# Patient Record
Sex: FEMALE | Race: WHITE | NOT HISPANIC OR LATINO | Employment: FULL TIME | URBAN - METROPOLITAN AREA
[De-identification: names, ages, dates, MRNs, and addresses within clinical notes are randomized per-mention and may not be internally consistent; named-entity substitution may affect disease eponyms.]

---

## 2023-02-16 PROBLEM — R74.8 ELEVATED LIVER ENZYMES: Status: ACTIVE | Noted: 2023-02-16

## 2023-02-16 PROBLEM — M54.50 LUMBAGO: Status: ACTIVE | Noted: 2023-02-16

## 2023-02-16 PROBLEM — M25.50 ARTHRALGIA: Status: ACTIVE | Noted: 2023-02-16

## 2023-02-16 PROBLEM — G47.33 OBSTRUCTIVE SLEEP APNEA: Status: ACTIVE | Noted: 2023-02-16

## 2023-02-16 PROBLEM — E66.01 MORBID OBESITY (MULTI): Status: ACTIVE | Noted: 2023-02-16

## 2023-02-16 PROBLEM — Z98.84 BARIATRIC SURGERY STATUS: Status: ACTIVE | Noted: 2023-02-16

## 2023-02-16 PROBLEM — J02.0 STREPTOCOCCAL PHARYNGITIS: Status: ACTIVE | Noted: 2023-02-16

## 2023-02-16 PROBLEM — E55.9 VITAMIN D DEFICIENCY: Status: ACTIVE | Noted: 2023-02-16

## 2023-02-16 PROBLEM — K75.81 NASH (NONALCOHOLIC STEATOHEPATITIS): Status: ACTIVE | Noted: 2023-02-16

## 2023-02-16 PROBLEM — J20.9 ACUTE BRONCHITIS, UNSPECIFIED: Status: ACTIVE | Noted: 2023-02-16

## 2023-02-16 PROBLEM — M25.569 PAIN IN JOINT, LOWER LEG: Status: RESOLVED | Noted: 2023-02-16 | Resolved: 2023-02-16

## 2023-02-16 PROBLEM — M54.6 DORSALGIA OF THORACIC REGION: Status: ACTIVE | Noted: 2023-02-16

## 2023-02-16 PROBLEM — F41.0 SEVERE ANXIETY WITH PANIC: Status: ACTIVE | Noted: 2023-02-16

## 2023-02-16 PROBLEM — N39.0 ACUTE UTI: Status: ACTIVE | Noted: 2023-02-16

## 2023-02-16 PROBLEM — E03.9 HYPOTHYROIDISM: Status: ACTIVE | Noted: 2023-02-16

## 2023-02-16 PROBLEM — R79.82 ELEVATED C-REACTIVE PROTEIN: Status: ACTIVE | Noted: 2023-02-16

## 2023-02-16 PROBLEM — M79.7 FIBROMYALGIA: Status: ACTIVE | Noted: 2023-02-16

## 2023-02-16 PROBLEM — J06.9 URI (UPPER RESPIRATORY INFECTION): Status: ACTIVE | Noted: 2023-02-16

## 2023-02-16 PROBLEM — J30.9 ALLERGIC RHINITIS: Status: ACTIVE | Noted: 2023-02-16

## 2023-02-16 PROBLEM — I10 HYPERTENSION: Status: ACTIVE | Noted: 2023-02-16

## 2023-02-16 PROBLEM — D72.825 BANDEMIA: Status: ACTIVE | Noted: 2023-02-16

## 2023-02-16 PROBLEM — M70.42 PREPATELLAR BURSITIS OF LEFT KNEE: Status: ACTIVE | Noted: 2023-02-16

## 2023-02-16 PROBLEM — M76.899 QUADRICEPS TENDINITIS: Status: ACTIVE | Noted: 2023-02-16

## 2023-02-16 PROBLEM — Z20.822 SUSPECTED COVID-19 VIRUS INFECTION: Status: ACTIVE | Noted: 2023-02-16

## 2023-02-16 PROBLEM — M99.02 SEGMENTAL AND SOMATIC DYSFUNCTION OF THORACIC REGION: Status: ACTIVE | Noted: 2023-02-16

## 2023-02-16 PROBLEM — R53.83 FATIGUE: Status: ACTIVE | Noted: 2023-02-16

## 2023-02-16 PROBLEM — R73.03 PREDIABETES: Status: ACTIVE | Noted: 2023-02-16

## 2023-02-16 PROBLEM — J02.9 SORE THROAT: Status: ACTIVE | Noted: 2023-02-16

## 2023-02-16 RX ORDER — LISINOPRIL AND HYDROCHLOROTHIAZIDE 20; 25 MG/1; MG/1
1 TABLET ORAL DAILY
COMMUNITY
Start: 2016-03-23 | End: 2023-07-17

## 2023-02-16 RX ORDER — LACTOBACILLUS ACIDOPHILUS/FOS 500MM-50MG
1 TABLET ORAL DAILY
COMMUNITY

## 2023-02-16 RX ORDER — LUTEIN 6 MG
1 TABLET ORAL 2 TIMES DAILY
COMMUNITY

## 2023-02-16 RX ORDER — ASPIRIN 325 MG
1 TABLET, DELAYED RELEASE (ENTERIC COATED) ORAL
COMMUNITY
Start: 2019-10-02

## 2023-02-16 RX ORDER — CYCLOBENZAPRINE HCL 10 MG
1 TABLET ORAL 3 TIMES DAILY PRN
COMMUNITY
Start: 2022-02-21 | End: 2023-04-06 | Stop reason: SDUPTHER

## 2023-02-16 RX ORDER — ALBUTEROL SULFATE 90 UG/1
2 AEROSOL, METERED RESPIRATORY (INHALATION)
COMMUNITY
Start: 2021-04-06 | End: 2023-04-06 | Stop reason: SDUPTHER

## 2023-02-16 RX ORDER — ALPRAZOLAM 0.5 MG/1
0.5 TABLET ORAL NIGHTLY PRN
COMMUNITY
Start: 2021-10-13 | End: 2023-04-06 | Stop reason: SDUPTHER

## 2023-02-16 RX ORDER — LEVOTHYROXINE, LIOTHYRONINE 9.5; 2.25 UG/1; UG/1
1 TABLET ORAL DAILY
COMMUNITY
Start: 2019-11-02 | End: 2023-05-17

## 2023-02-16 RX ORDER — HYDROXYZINE HYDROCHLORIDE 25 MG/1
25 TABLET, FILM COATED ORAL ONCE
COMMUNITY
Start: 2022-01-19 | End: 2023-10-05 | Stop reason: SDUPTHER

## 2023-02-16 RX ORDER — SERTRALINE HYDROCHLORIDE 25 MG/1
1 TABLET, FILM COATED ORAL DAILY
COMMUNITY
Start: 2021-10-13 | End: 2023-10-05 | Stop reason: ALTCHOICE

## 2023-04-06 ENCOUNTER — TELEPHONE (OUTPATIENT)
Dept: PRIMARY CARE | Facility: CLINIC | Age: 38
End: 2023-04-06

## 2023-04-06 ENCOUNTER — OFFICE VISIT (OUTPATIENT)
Dept: PRIMARY CARE | Facility: CLINIC | Age: 38
End: 2023-04-06
Payer: COMMERCIAL

## 2023-04-06 VITALS
TEMPERATURE: 97.5 F | OXYGEN SATURATION: 98 % | HEIGHT: 67 IN | HEART RATE: 74 BPM | SYSTOLIC BLOOD PRESSURE: 136 MMHG | BODY MASS INDEX: 45.99 KG/M2 | DIASTOLIC BLOOD PRESSURE: 64 MMHG | WEIGHT: 293 LBS

## 2023-04-06 DIAGNOSIS — K52.9 ENTEROCOLITIS: ICD-10-CM

## 2023-04-06 DIAGNOSIS — M79.7 FIBROMYALGIA: ICD-10-CM

## 2023-04-06 DIAGNOSIS — K52.9 ENTEROCOLITIS: Primary | ICD-10-CM

## 2023-04-06 DIAGNOSIS — E66.01 CLASS 3 SEVERE OBESITY DUE TO EXCESS CALORIES WITH SERIOUS COMORBIDITY AND BODY MASS INDEX (BMI) OF 60.0 TO 69.9 IN ADULT (MULTI): ICD-10-CM

## 2023-04-06 DIAGNOSIS — J20.9 ACUTE BRONCHITIS, UNSPECIFIED ORGANISM: ICD-10-CM

## 2023-04-06 DIAGNOSIS — F43.10 PTSD (POST-TRAUMATIC STRESS DISORDER): ICD-10-CM

## 2023-04-06 PROCEDURE — 3008F BODY MASS INDEX DOCD: CPT | Performed by: FAMILY MEDICINE

## 2023-04-06 PROCEDURE — 3078F DIAST BP <80 MM HG: CPT | Performed by: FAMILY MEDICINE

## 2023-04-06 PROCEDURE — 3075F SYST BP GE 130 - 139MM HG: CPT | Performed by: FAMILY MEDICINE

## 2023-04-06 PROCEDURE — 99214 OFFICE O/P EST MOD 30 MIN: CPT | Performed by: FAMILY MEDICINE

## 2023-04-06 RX ORDER — PREDNISONE 10 MG/1
TABLET ORAL
Qty: 21 TABLET | Refills: 0 | Status: SHIPPED | OUTPATIENT
Start: 2023-04-06 | End: 2023-04-12

## 2023-04-06 RX ORDER — CIPROFLOXACIN 500 MG/1
500 TABLET ORAL 2 TIMES DAILY
Qty: 7 TABLET | Refills: 0 | Status: SHIPPED | OUTPATIENT
Start: 2023-04-06 | End: 2023-04-11

## 2023-04-06 RX ORDER — PREDNISONE 10 MG/1
TABLET ORAL
Qty: 21 TABLET | Refills: 0 | Status: SHIPPED | OUTPATIENT
Start: 2023-04-06 | End: 2023-04-06 | Stop reason: SDUPTHER

## 2023-04-06 RX ORDER — ALBUTEROL SULFATE 90 UG/1
2 AEROSOL, METERED RESPIRATORY (INHALATION)
Qty: 18 G | Refills: 2 | Status: SHIPPED | OUTPATIENT
Start: 2023-04-06

## 2023-04-06 RX ORDER — CYCLOBENZAPRINE HCL 10 MG
10 TABLET ORAL 3 TIMES DAILY PRN
Qty: 30 TABLET | Refills: 1 | Status: SHIPPED | OUTPATIENT
Start: 2023-04-06 | End: 2023-10-25 | Stop reason: SDUPTHER

## 2023-04-06 RX ORDER — ALPRAZOLAM 0.5 MG/1
0.5 TABLET ORAL NIGHTLY PRN
Qty: 7 TABLET | Refills: 0 | Status: SHIPPED | OUTPATIENT
Start: 2023-04-06

## 2023-04-06 RX ORDER — METRONIDAZOLE 250 MG/1
250 TABLET ORAL 3 TIMES DAILY
Qty: 21 TABLET | Refills: 0 | Status: SHIPPED | OUTPATIENT
Start: 2023-04-06 | End: 2023-04-13

## 2023-04-06 ASSESSMENT — PATIENT HEALTH QUESTIONNAIRE - PHQ9
1. LITTLE INTEREST OR PLEASURE IN DOING THINGS: NOT AT ALL
2. FEELING DOWN, DEPRESSED OR HOPELESS: NOT AT ALL
SUM OF ALL RESPONSES TO PHQ9 QUESTIONS 1 AND 2: 0

## 2023-04-06 NOTE — TELEPHONE ENCOUNTER
Pharmacy called  stating the prednisone was sent over asking for you to clarify directions  it was a taper but at day 3 it says to stay on for 2 days. But quanity was not suffient. Please resend with correct taper in notes or quanity to match

## 2023-04-06 NOTE — PROGRESS NOTES
Subjective   Patient ID: Dinora Christensen is a 37 y.o. female who presents for Follow-up (Having stomach problems).  HPI  Saw ED 3/30  Some relief with Bentyl  Taking Tumeric powder again  Has Appt GI on 4/21/23  Still having abd pain  Is taking mag malate 2 a night.  Also taking fibro response which has mag malate in it.  Had first formed bowel movement after starting the Bentyl.    Has seen Dr Chávez for HENRY  Abd pain is still in the RLQ (worst)but is throughout the whole abd.  Pain is worsening.  Avoiding gluten and dairy.  Having nausea and Vomiting occurring every couple days.  6-20 BM daily.      Review of Systems    Objective   Physical Exam  General: Patient is alert and oriented ×3 and appears in no acute distress. No respiratory distress.  Morbid obesity.  Gait cannot    Head: Atraumatic normocephalic.    Eyes: EOMI, PERRLA      Ears: Canals patent without any irritation, tympanic membranes without inflammation, no swelling, normal light reflex.    Nose: Nares patent. Turbinates are not swollen. No discharge.    Mouth: Normal mucosa. Moist. No erythema, exudates, tonsillar enlargement.    Neck: Normal range of motion, no masses.  Thyroid is palpable and normal in size without any nodules. No anterior cervical or posterior cervical adenopathy.    Heart: Regular rate and rhythm, no murmurs clicks or gallops    Lungs: Clear to auscultation bilaterally without any rhonchi rales or wheezing, lung sounds heard throughout all lung fields    Abdomen: Soft, there is tenderness, no rigidity, rebound, guarding or organomegaly. Bowel sounds ×4 quadrants.    Musculoskeletal: Normal range of motion, strength is grossly intact in the proximal distal muscles of the upper and lower extremities bilaterally, deep tendon reflexes +2 out of 4 and symmetric bilaterally at the patella, Achilles, biceps, triceps, sensation intact.    Nerves: Cranial nerves II through XII appear grossly intact and without deficit    Skin: Intact,  dry, no rashes or erythema    Psych: Normal affect. ]  Assessment/Plan   Problem List Items Addressed This Visit       Acute bronchitis, unspecified    Relevant Medications    albuterol 90 mcg/actuation inhaler    Fibromyalgia    Relevant Medications    cyclobenzaprine (Flexeril) 10 mg tablet    PTSD (post-traumatic stress disorder)    Relevant Medications    ALPRAZolam (Xanax) 0.5 mg tablet    Enterocolitis - Primary    Relevant Medications    metroNIDAZOLE (Flagyl) 250 mg tablet    ciprofloxacin (Cipro) 500 mg tablet    Class 3 severe obesity due to excess calories with serious comorbidity and body mass index (BMI) of 60.0 to 69.9 in adult (CMS/[facility])

## 2023-04-08 PROBLEM — E66.813 CLASS 3 SEVERE OBESITY DUE TO EXCESS CALORIES WITH SERIOUS COMORBIDITY AND BODY MASS INDEX (BMI) OF 60.0 TO 69.9 IN ADULT: Status: ACTIVE | Noted: 2023-04-08

## 2023-04-08 PROBLEM — F43.10 PTSD (POST-TRAUMATIC STRESS DISORDER): Status: ACTIVE | Noted: 2023-04-08

## 2023-04-08 PROBLEM — K52.9 ENTEROCOLITIS: Status: ACTIVE | Noted: 2023-04-08

## 2023-04-08 PROBLEM — E66.01 CLASS 3 SEVERE OBESITY DUE TO EXCESS CALORIES WITH SERIOUS COMORBIDITY AND BODY MASS INDEX (BMI) OF 60.0 TO 69.9 IN ADULT (MULTI): Status: ACTIVE | Noted: 2023-04-08

## 2023-05-17 DIAGNOSIS — E03.9 HYPOTHYROIDISM, UNSPECIFIED TYPE: Primary | ICD-10-CM

## 2023-05-17 RX ORDER — LEVOTHYROXINE, LIOTHYRONINE 9.5; 2.25 UG/1; UG/1
TABLET ORAL
Qty: 30 TABLET | Refills: 4 | Status: SHIPPED | OUTPATIENT
Start: 2023-05-17 | End: 2023-10-16

## 2023-05-25 ENCOUNTER — CLINICAL SUPPORT (OUTPATIENT)
Dept: PRIMARY CARE | Facility: CLINIC | Age: 38
End: 2023-05-25
Payer: COMMERCIAL

## 2023-05-25 ENCOUNTER — TELEPHONE (OUTPATIENT)
Dept: PRIMARY CARE | Facility: CLINIC | Age: 38
End: 2023-05-25

## 2023-05-25 ENCOUNTER — APPOINTMENT (OUTPATIENT)
Dept: PRIMARY CARE | Facility: CLINIC | Age: 38
End: 2023-05-25
Payer: COMMERCIAL

## 2023-05-25 DIAGNOSIS — J02.9 SORE THROAT: ICD-10-CM

## 2023-05-25 LAB — POC RAPID STREP: NEGATIVE

## 2023-05-25 PROCEDURE — 87651 STREP A DNA AMP PROBE: CPT

## 2023-05-25 PROCEDURE — 87880 STREP A ASSAY W/OPTIC: CPT | Performed by: FAMILY MEDICINE

## 2023-05-25 NOTE — PROGRESS NOTES
Dinora came in to have a strep test done. It came back negative. We are sending it out for culture.

## 2023-05-25 NOTE — TELEPHONE ENCOUNTER
I put patient on for a virtual due to possible strep (very sore throat, swllen and blisters in back of throat). She said she was around her nephews who had it and are currently being treated. Do you want her to come in today either to do a visit in person or just for a strep test?

## 2023-05-26 ENCOUNTER — TELEPHONE (OUTPATIENT)
Dept: PRIMARY CARE | Facility: CLINIC | Age: 38
End: 2023-05-26
Payer: COMMERCIAL

## 2023-05-26 DIAGNOSIS — J02.9 SORE THROAT: Primary | ICD-10-CM

## 2023-05-26 LAB — GROUP A STREP, PCR: NOT DETECTED

## 2023-05-26 RX ORDER — AMOXICILLIN AND CLAVULANATE POTASSIUM 875; 125 MG/1; MG/1
875 TABLET, FILM COATED ORAL 2 TIMES DAILY
Qty: 20 TABLET | Refills: 0 | Status: SHIPPED | OUTPATIENT
Start: 2023-05-26 | End: 2023-05-26 | Stop reason: SDUPTHER

## 2023-05-26 RX ORDER — AMOXICILLIN AND CLAVULANATE POTASSIUM 875; 125 MG/1; MG/1
875 TABLET, FILM COATED ORAL 2 TIMES DAILY
Qty: 20 TABLET | Refills: 0 | Status: SHIPPED | OUTPATIENT
Start: 2023-05-26 | End: 2023-06-05

## 2023-06-06 NOTE — TELEPHONE ENCOUNTER
----- Message from Glenn Gonzales DO sent at 6/6/2023  9:19 AM EDT -----  Negative for strep  ----- Message -----  From: Mitra Guthrie MA  Sent: 5/25/2023   1:41 PM EDT  To: Glenn Gonzales DO

## 2023-07-17 DIAGNOSIS — I10 PRIMARY HYPERTENSION: Primary | ICD-10-CM

## 2023-07-17 RX ORDER — LISINOPRIL AND HYDROCHLOROTHIAZIDE 20; 25 MG/1; MG/1
TABLET ORAL
Qty: 30 TABLET | Refills: 11 | Status: SHIPPED | OUTPATIENT
Start: 2023-07-17

## 2023-10-05 ENCOUNTER — OFFICE VISIT (OUTPATIENT)
Dept: PRIMARY CARE | Facility: CLINIC | Age: 38
End: 2023-10-05
Payer: COMMERCIAL

## 2023-10-05 VITALS
SYSTOLIC BLOOD PRESSURE: 128 MMHG | HEART RATE: 87 BPM | WEIGHT: 293 LBS | BODY MASS INDEX: 45.99 KG/M2 | HEIGHT: 67 IN | TEMPERATURE: 97.2 F | OXYGEN SATURATION: 98 % | DIASTOLIC BLOOD PRESSURE: 82 MMHG

## 2023-10-05 DIAGNOSIS — K75.81 NASH (NONALCOHOLIC STEATOHEPATITIS): ICD-10-CM

## 2023-10-05 DIAGNOSIS — F43.10 PTSD (POST-TRAUMATIC STRESS DISORDER): ICD-10-CM

## 2023-10-05 DIAGNOSIS — E66.01 CLASS 3 SEVERE OBESITY DUE TO EXCESS CALORIES WITH SERIOUS COMORBIDITY AND BODY MASS INDEX (BMI) OF 60.0 TO 69.9 IN ADULT (MULTI): ICD-10-CM

## 2023-10-05 DIAGNOSIS — E03.8 HYPOTHYROIDISM DUE TO HASHIMOTO'S THYROIDITIS: ICD-10-CM

## 2023-10-05 DIAGNOSIS — M79.7 FIBROMYALGIA: ICD-10-CM

## 2023-10-05 DIAGNOSIS — I10 PRIMARY HYPERTENSION: ICD-10-CM

## 2023-10-05 DIAGNOSIS — L21.0 SEBORRHEA CAPITIS: ICD-10-CM

## 2023-10-05 DIAGNOSIS — E11.9 TYPE 2 DIABETES MELLITUS WITHOUT COMPLICATION, WITHOUT LONG-TERM CURRENT USE OF INSULIN (MULTI): ICD-10-CM

## 2023-10-05 DIAGNOSIS — G47.33 OBSTRUCTIVE SLEEP APNEA: ICD-10-CM

## 2023-10-05 DIAGNOSIS — E06.3 HYPOTHYROIDISM DUE TO HASHIMOTO'S THYROIDITIS: ICD-10-CM

## 2023-10-05 DIAGNOSIS — Z00.00 WELL ADULT EXAM: Primary | ICD-10-CM

## 2023-10-05 PROBLEM — R10.13 EPIGASTRIC PAIN: Status: ACTIVE | Noted: 2023-10-05

## 2023-10-05 PROBLEM — R19.7 DIARRHEA: Status: ACTIVE | Noted: 2023-10-05

## 2023-10-05 PROCEDURE — 99395 PREV VISIT EST AGE 18-39: CPT | Performed by: FAMILY MEDICINE

## 2023-10-05 PROCEDURE — 3008F BODY MASS INDEX DOCD: CPT | Performed by: FAMILY MEDICINE

## 2023-10-05 PROCEDURE — 1036F TOBACCO NON-USER: CPT | Performed by: FAMILY MEDICINE

## 2023-10-05 PROCEDURE — 3074F SYST BP LT 130 MM HG: CPT | Performed by: FAMILY MEDICINE

## 2023-10-05 PROCEDURE — 3079F DIAST BP 80-89 MM HG: CPT | Performed by: FAMILY MEDICINE

## 2023-10-05 PROCEDURE — 99214 OFFICE O/P EST MOD 30 MIN: CPT | Performed by: FAMILY MEDICINE

## 2023-10-05 RX ORDER — HYDROCORTISONE 25 MG/G
CREAM TOPICAL 2 TIMES DAILY PRN
Qty: 30 G | Refills: 2 | Status: SHIPPED | OUTPATIENT
Start: 2023-10-05 | End: 2024-10-04

## 2023-10-05 RX ORDER — HYDROXYZINE HYDROCHLORIDE 25 MG/1
25 TABLET, FILM COATED ORAL NIGHTLY
Qty: 30 TABLET | Refills: 3 | Status: SHIPPED | OUTPATIENT
Start: 2023-10-05 | End: 2024-02-02

## 2023-10-05 RX ORDER — KETOCONAZOLE 20 MG/ML
SHAMPOO, SUSPENSION TOPICAL 2 TIMES WEEKLY
Qty: 120 ML | Refills: 0 | Status: SHIPPED | OUTPATIENT
Start: 2023-10-05

## 2023-10-05 ASSESSMENT — PATIENT HEALTH QUESTIONNAIRE - PHQ9
2. FEELING DOWN, DEPRESSED OR HOPELESS: NOT AT ALL
1. LITTLE INTEREST OR PLEASURE IN DOING THINGS: NOT AT ALL
SUM OF ALL RESPONSES TO PHQ9 QUESTIONS 1 AND 2: 0

## 2023-10-05 NOTE — PROGRESS NOTES
Subjective   Patient ID: Dinora Christensen is a 38 y.o. female who presents for Follow-up (Talk about her hip /).  HPI  Follow up on chronic medical issues  The patient continues to deal with her posttraumatic stress disorder. We do have her taking hydroxyzine as needed at nighttime     essential hypertension  Controlled on lisinopril hydrochlorothiazide. Denies any problems with headaches, change in vision, chest pain, shortness breath, swelling in the extremities.     Nonalcoholic fatty liver disease   losing weight Patient is following with hepatologist       Severe morbid obesity  Patient's BMI is 61 which is down from 64. She has been losing weight and trying to follow diet and exercise. Continue current path     diabetes  HBA1C has been in the diabetic range  The patient's health since the last visit is described as good. There are no interval changes in the patient's PMH, PSH, and current medications. There are no interval changes in the patient's social and family history. She has regular dental visits. She denies vision problems.   Lifestyle: She consumes a diverse and healthy diet. She has weight concerns. She exercises regularly. She does not use tobacco. She consumes alcohol very rarely  Reproductive health: the patient is premenopausal. she reports abnormal menses. No relationship. Painful and heavy. Regular interval.  GYN needs to see.    Pregnancy History:  1 and 1 full term.   Cervical cancer screening: cancer screening reviewed and current . patient has no history of an abnormal pap smear.    Screening interval recommendation: No abnormal PAP.   Metabolic screening: lipid profile performed within the past five years.   Review of Systems    Objective   Physical Exam  General: Patient is alert and oriented Ã--3 and appears in no acute distress. No respiratory distress. Morbid obesity     Head: Atraumatic normocephalic.     Eyes: EOMI, PERRLA      Ears: Canals patent without any irritation,  tympanic membranes without inflammation, no swelling, normal light reflex.     Mouth: Normal mucosa. Moist. No erythema, exudates, tonsillar enlargement.     Neck: Normal range of motion, no masses. Thyroid is palpable and normal in size without any nodules. No anterior cervical or posterior cervical adenopathy.     Heart: Regular rate and rhythm, no murmurs clicks or gallops     Lungs: Clear to auscultation bilaterally without any rhonchi rales or wheezing, lung sounds heard throughout all lung fields     Abdomen: Soft, nontender, no rigidity, rebound, guarding or organomegaly. Bowel sounds Ã--4 quadrants.     Musculoskeletal: Normal range of motion, strength is grossly intact in the proximal distal muscles of the upper and lower extremities bilaterally, deep tendon reflexes +2 out of 4 and symmetric bilaterally at the patella, Achilles, biceps, triceps, sensation intact.     Nerves: Cranial nerves II through XII appear grossly intact and without deficit     Skin: Intact, dry, no rashes or erythema     Psych: Normal affect. Denies suicidal or homicidal ideations.   Assessment/Plan   Problem List Items Addressed This Visit       Fibromyalgia    Hypertension    Relevant Orders    CBC and Auto Differential    Comprehensive Metabolic Panel    Lipid Panel    TSH with reflex to Free T4 if abnormal    Vitamin B12    Hypothyroidism    Relevant Orders    TSH with reflex to Free T4 if abnormal    HENRY (nonalcoholic steatohepatitis)    Relevant Medications    semaglutide 0.25 mg or 0.5 mg (2 mg/3 mL) pen injector    Obstructive sleep apnea    PTSD (post-traumatic stress disorder)    Relevant Medications    hydrOXYzine HCL (Atarax) 25 mg tablet    Class 3 severe obesity due to excess calories with serious comorbidity and body mass index (BMI) of 60.0 to 69.9 in adult (CMS/[facility])     Other Visit Diagnoses       Well adult exam    -  Primary    Type 2 diabetes mellitus without complication, without long-term current use of insulin  (CMS/HCC)        Relevant Medications    semaglutide 0.25 mg or 0.5 mg (2 mg/3 mL) pen injector    Other Relevant Orders    CBC and Auto Differential    Comprehensive Metabolic Panel    Lipid Panel    TSH with reflex to Free T4 if abnormal    Vitamin B12    Seborrhea capitis        Relevant Medications    ketoconazole (NIZOral) 2 % shampoo    hydrocortisone 2.5 % cream           The patient is a 38-year-old female with a past medical history of obstructive sleep apnea, nonalcoholic fatty liver disease, hypertension, hypothyroidism, prediabetes  Depression and anxiety and following with counselor. Controlled with sertraline 25 mg daily. Patient denies any suicidal or homicidal ideations.  Labs ordered to follow-up on chronic conditions  Instructed her to follow-up with  [facility] or gynecology  Patient has seen Dr. Chávez for the nonalcoholic fatty liver. At this point time was instructed to have weight loss.   Patient has followed up with Dr. Rober Navarrete for gastroenterology and they were going to schedule a scope but she had resolution of her abdominal pain.  Most likely viral  Diabetes mellitus type 2-started Ozempic 0.5 mg which she will do weekly.  Discussed the risk and benefit of the medication.  We will follow-up on this in 6 months      Essential hypertension is controlled on current medications  Patient does have seborrhea over the left ear.  Started ketoconazole cream and also gave hydrocortisone cream to put on 2 times a day for 14 days    Follow-up as needed or in 6 months

## 2023-10-16 DIAGNOSIS — E03.9 HYPOTHYROIDISM, UNSPECIFIED TYPE: ICD-10-CM

## 2023-10-16 RX ORDER — LEVOTHYROXINE, LIOTHYRONINE 9.5; 2.25 UG/1; UG/1
TABLET ORAL
Qty: 30 TABLET | Refills: 4 | Status: SHIPPED | OUTPATIENT
Start: 2023-10-16 | End: 2023-10-25

## 2023-10-24 ENCOUNTER — LAB (OUTPATIENT)
Dept: LAB | Facility: LAB | Age: 38
End: 2023-10-24
Payer: COMMERCIAL

## 2023-10-24 DIAGNOSIS — I10 PRIMARY HYPERTENSION: ICD-10-CM

## 2023-10-24 DIAGNOSIS — E03.9 HYPOTHYROIDISM, UNSPECIFIED TYPE: ICD-10-CM

## 2023-10-24 DIAGNOSIS — E11.9 TYPE 2 DIABETES MELLITUS WITHOUT COMPLICATION, WITHOUT LONG-TERM CURRENT USE OF INSULIN (MULTI): ICD-10-CM

## 2023-10-24 DIAGNOSIS — E03.8 HYPOTHYROIDISM DUE TO HASHIMOTO'S THYROIDITIS: ICD-10-CM

## 2023-10-24 DIAGNOSIS — E06.3 HYPOTHYROIDISM DUE TO HASHIMOTO'S THYROIDITIS: ICD-10-CM

## 2023-10-24 LAB
ALBUMIN SERPL BCP-MCNC: 4 G/DL (ref 3.4–5)
ALP SERPL-CCNC: 49 U/L (ref 33–110)
ALT SERPL W P-5'-P-CCNC: 23 U/L (ref 7–45)
ANION GAP SERPL CALC-SCNC: 13 MMOL/L (ref 10–20)
AST SERPL W P-5'-P-CCNC: 24 U/L (ref 9–39)
BASOPHILS # BLD AUTO: 0.06 X10*3/UL (ref 0–0.1)
BASOPHILS NFR BLD AUTO: 0.6 %
BILIRUB SERPL-MCNC: 0.6 MG/DL (ref 0–1.2)
BUN SERPL-MCNC: 14 MG/DL (ref 6–23)
CALCIUM SERPL-MCNC: 9.1 MG/DL (ref 8.6–10.3)
CHLORIDE SERPL-SCNC: 101 MMOL/L (ref 98–107)
CHOLEST SERPL-MCNC: 153 MG/DL (ref 0–199)
CHOLESTEROL/HDL RATIO: 3.4
CO2 SERPL-SCNC: 25 MMOL/L (ref 21–32)
CREAT SERPL-MCNC: 0.69 MG/DL (ref 0.5–1.05)
EOSINOPHIL # BLD AUTO: 0.42 X10*3/UL (ref 0–0.7)
EOSINOPHIL NFR BLD AUTO: 4.4 %
ERYTHROCYTE [DISTWIDTH] IN BLOOD BY AUTOMATED COUNT: 14.2 % (ref 11.5–14.5)
GFR SERPL CREATININE-BSD FRML MDRD: >90 ML/MIN/1.73M*2
GLUCOSE SERPL-MCNC: 97 MG/DL (ref 74–99)
HCT VFR BLD AUTO: 40.4 % (ref 36–46)
HDLC SERPL-MCNC: 45.5 MG/DL
HGB BLD-MCNC: 12.7 G/DL (ref 12–16)
IMM GRANULOCYTES # BLD AUTO: 0.03 X10*3/UL (ref 0–0.7)
IMM GRANULOCYTES NFR BLD AUTO: 0.3 % (ref 0–0.9)
LDLC SERPL CALC-MCNC: 91 MG/DL
LYMPHOCYTES # BLD AUTO: 2.06 X10*3/UL (ref 1.2–4.8)
LYMPHOCYTES NFR BLD AUTO: 21.5 %
MCH RBC QN AUTO: 26.5 PG (ref 26–34)
MCHC RBC AUTO-ENTMCNC: 31.4 G/DL (ref 32–36)
MCV RBC AUTO: 84 FL (ref 80–100)
MONOCYTES # BLD AUTO: 0.79 X10*3/UL (ref 0.1–1)
MONOCYTES NFR BLD AUTO: 8.2 %
NEUTROPHILS # BLD AUTO: 6.23 X10*3/UL (ref 1.2–7.7)
NEUTROPHILS NFR BLD AUTO: 65 %
NON HDL CHOLESTEROL: 108 MG/DL (ref 0–149)
NRBC BLD-RTO: 0 /100 WBCS (ref 0–0)
PLATELET # BLD AUTO: 446 X10*3/UL (ref 150–450)
PMV BLD AUTO: 9 FL (ref 7.5–11.5)
POTASSIUM SERPL-SCNC: 4.3 MMOL/L (ref 3.5–5.3)
PROT SERPL-MCNC: 7.2 G/DL (ref 6.4–8.2)
RBC # BLD AUTO: 4.79 X10*6/UL (ref 4–5.2)
SODIUM SERPL-SCNC: 135 MMOL/L (ref 136–145)
T4 FREE SERPL-MCNC: 0.9 NG/DL (ref 0.61–1.12)
TRIGL SERPL-MCNC: 84 MG/DL (ref 0–149)
TSH SERPL-ACNC: 4.27 MIU/L (ref 0.44–3.98)
VIT B12 SERPL-MCNC: 397 PG/ML (ref 211–911)
VLDL: 17 MG/DL (ref 0–40)
WBC # BLD AUTO: 9.6 X10*3/UL (ref 4.4–11.3)

## 2023-10-24 PROCEDURE — 84439 ASSAY OF FREE THYROXINE: CPT

## 2023-10-24 PROCEDURE — 36415 COLL VENOUS BLD VENIPUNCTURE: CPT

## 2023-10-24 PROCEDURE — 82607 VITAMIN B-12: CPT

## 2023-10-24 PROCEDURE — 80061 LIPID PANEL: CPT

## 2023-10-24 PROCEDURE — 85025 COMPLETE CBC W/AUTO DIFF WBC: CPT

## 2023-10-24 PROCEDURE — 80053 COMPREHEN METABOLIC PANEL: CPT

## 2023-10-24 PROCEDURE — 84443 ASSAY THYROID STIM HORMONE: CPT

## 2023-10-25 DIAGNOSIS — M79.7 FIBROMYALGIA: ICD-10-CM

## 2023-10-25 RX ORDER — THYROID 30 MG/1
30 TABLET ORAL DAILY
Qty: 30 TABLET | Refills: 11 | Status: SHIPPED | OUTPATIENT
Start: 2023-10-25 | End: 2024-10-24

## 2023-10-25 RX ORDER — CYCLOBENZAPRINE HCL 10 MG
10 TABLET ORAL 3 TIMES DAILY PRN
Qty: 30 TABLET | Refills: 1 | Status: SHIPPED | OUTPATIENT
Start: 2023-10-25 | End: 2024-04-11 | Stop reason: SDUPTHER

## 2023-10-25 NOTE — RESULT ENCOUNTER NOTE
Please let the patient know that her thyroid was low and I will increase her thyroid medication.  The remainder of her labs were essentially normal.  I would suggest that she take a vitamin B12 supplement daily.  Please see what pharmacy she uses.  She can take 2 of her current tyroid medication until the new dose comes in

## 2023-10-25 NOTE — TELEPHONE ENCOUNTER
----- Message from Glenn Gonzales, DO sent at 10/25/2023  7:17 AM EDT -----  Please let the patient know that her thyroid was low and I will increase her thyroid medication.  The remainder of her labs were essentially normal.  I would suggest that she take a vitamin B12 supplement daily.  Please see what pharmacy she uses.  She can take 2 of her current tyroid medication until the new dose comes in

## 2024-04-04 ENCOUNTER — APPOINTMENT (OUTPATIENT)
Dept: PRIMARY CARE | Facility: CLINIC | Age: 39
End: 2024-04-04

## 2024-04-09 ENCOUNTER — TELEPHONE (OUTPATIENT)
Dept: PRIMARY CARE | Facility: CLINIC | Age: 39
End: 2024-04-09

## 2024-04-09 NOTE — TELEPHONE ENCOUNTER
Left message to call to schedule appt and also to have new insurance info available when she calls back.

## 2024-04-11 DIAGNOSIS — M79.7 FIBROMYALGIA: ICD-10-CM

## 2024-04-11 RX ORDER — CYCLOBENZAPRINE HCL 10 MG
10 TABLET ORAL 3 TIMES DAILY PRN
Qty: 30 TABLET | Refills: 1 | Status: SHIPPED | OUTPATIENT
Start: 2024-04-11

## 2024-07-03 DIAGNOSIS — I10 PRIMARY HYPERTENSION: ICD-10-CM

## 2024-07-03 RX ORDER — LISINOPRIL AND HYDROCHLOROTHIAZIDE 20; 25 MG/1; MG/1
TABLET ORAL
Qty: 30 TABLET | Refills: 11 | Status: SHIPPED | OUTPATIENT
Start: 2024-07-03

## 2024-07-10 ENCOUNTER — APPOINTMENT (OUTPATIENT)
Dept: PRIMARY CARE | Facility: CLINIC | Age: 39
End: 2024-07-10

## 2024-07-10 VITALS
WEIGHT: 293 LBS | DIASTOLIC BLOOD PRESSURE: 68 MMHG | SYSTOLIC BLOOD PRESSURE: 108 MMHG | OXYGEN SATURATION: 96 % | HEIGHT: 67 IN | BODY MASS INDEX: 45.99 KG/M2 | RESPIRATION RATE: 18 BRPM | HEART RATE: 84 BPM

## 2024-07-10 DIAGNOSIS — E66.01 CLASS 3 SEVERE OBESITY DUE TO EXCESS CALORIES WITH SERIOUS COMORBIDITY AND BODY MASS INDEX (BMI) OF 60.0 TO 69.9 IN ADULT (MULTI): ICD-10-CM

## 2024-07-10 DIAGNOSIS — I10 PRIMARY HYPERTENSION: ICD-10-CM

## 2024-07-10 DIAGNOSIS — K75.81 NASH (NONALCOHOLIC STEATOHEPATITIS): ICD-10-CM

## 2024-07-10 DIAGNOSIS — F43.10 PTSD (POST-TRAUMATIC STRESS DISORDER): ICD-10-CM

## 2024-07-10 DIAGNOSIS — M79.7 FIBROMYALGIA: Primary | ICD-10-CM

## 2024-07-10 PROCEDURE — 3074F SYST BP LT 130 MM HG: CPT | Performed by: FAMILY MEDICINE

## 2024-07-10 PROCEDURE — 3078F DIAST BP <80 MM HG: CPT | Performed by: FAMILY MEDICINE

## 2024-07-10 PROCEDURE — 3008F BODY MASS INDEX DOCD: CPT | Performed by: FAMILY MEDICINE

## 2024-07-10 PROCEDURE — 99213 OFFICE O/P EST LOW 20 MIN: CPT | Performed by: FAMILY MEDICINE

## 2024-07-10 RX ORDER — TIRZEPATIDE 5 MG/.5ML
5 INJECTION, SOLUTION SUBCUTANEOUS
Qty: 2 ML | Refills: 3 | Status: SHIPPED | OUTPATIENT
Start: 2024-07-14 | End: 2024-11-03

## 2024-07-10 RX ORDER — TIRZEPATIDE 2.5 MG/.5ML
2.5 INJECTION, SOLUTION SUBCUTANEOUS
Qty: 2 ML | Refills: 0 | Status: SHIPPED | OUTPATIENT
Start: 2024-07-14

## 2024-07-10 RX ORDER — HYDROXYZINE HYDROCHLORIDE 25 MG/1
25 TABLET, FILM COATED ORAL NIGHTLY
Qty: 30 TABLET | Refills: 3 | Status: SHIPPED | OUTPATIENT
Start: 2024-07-10 | End: 2024-11-07

## 2024-07-10 RX ORDER — LISINOPRIL AND HYDROCHLOROTHIAZIDE 10; 12.5 MG/1; MG/1
1 TABLET ORAL DAILY
Qty: 100 TABLET | Refills: 3 | Status: SHIPPED | OUTPATIENT
Start: 2024-07-10 | End: 2025-08-14

## 2024-07-10 ASSESSMENT — PATIENT HEALTH QUESTIONNAIRE - PHQ9
1. LITTLE INTEREST OR PLEASURE IN DOING THINGS: NOT AT ALL
SUM OF ALL RESPONSES TO PHQ9 QUESTIONS 1 AND 2: 0
2. FEELING DOWN, DEPRESSED OR HOPELESS: NOT AT ALL

## 2024-07-10 NOTE — PROGRESS NOTES
Subjective   Patient ID: Dinora Christensen is a 39 y.o. female who presents for follow-up on chronic conditions  HPI  Follow up on chronic medical issues  The patient continues to deal with her posttraumatic stress disorder. We do have her taking hydroxyzine as needed at nighttime     essential hypertension  Controlled on lisinopril hydrochlorothiazide. Denies any problems with headaches, change in vision, chest pain, shortness breath, swelling in the extremities.     Nonalcoholic fatty liver disease   losing weight Patient is following with hepatologist       Severe morbid obesity  Patient's BMI is 61 which is down from 64. She has been losing weight and trying to follow diet and exercise. Continue current path     diabetes  HBA1C has been in the diabetic range  The patient's health since the last visit is described as good. There are no interval changes in the patient's PMH, PSH, and current medications. There are no interval changes in the patient's social and family history. She has regular dental visits. She denies vision problems.   Lifestyle: She consumes a diverse and healthy diet. She has weight concerns. She exercises regularly. She does not use tobacco. She consumes alcohol very rarely  Reproductive health: the patient is premenopausal. she reports abnormal menses. No relationship. Painful and heavy. Regular interval.  GYN needs to see.    Pregnancy History:  1 and 1 full term.   Cervical cancer screening: cancer screening reviewed and current . patient has no history of an abnormal pap smear.    Screening interval recommendation: No abnormal PAP.   Metabolic screening: lipid profile performed within the past five years.   Review of Systems    Objective   Physical Exam  General: Patient is alert and oriented Ã--3 and appears in no acute distress. No respiratory distress. Morbid obesity     Head: Atraumatic normocephalic.     Eyes: EOMI, PERRLA      Ears: Canals patent without any irritation,  tympanic membranes without inflammation, no swelling, normal light reflex.     Mouth: Normal mucosa. Moist. No erythema, exudates, tonsillar enlargement.     Neck: Normal range of motion, no masses. Thyroid is palpable and normal in size without any nodules. No anterior cervical or posterior cervical adenopathy.     Heart: Regular rate and rhythm, no murmurs clicks or gallops     Lungs: Clear to auscultation bilaterally without any rhonchi rales or wheezing, lung sounds heard throughout all lung fields     Abdomen: Soft, nontender, no rigidity, rebound, guarding or organomegaly. Bowel sounds Ã--4 quadrants.     Musculoskeletal: Normal range of motion, strength is grossly intact in the proximal distal muscles of the upper and lower extremities bilaterally, deep tendon reflexes +2 out of 4 and symmetric bilaterally at the patella, Achilles, biceps, triceps, sensation intact.     Nerves: Cranial nerves II through XII appear grossly intact and without deficit     Skin: Intact, dry, no rashes or erythema     Psych: Normal affect. Denies suicidal or homicidal ideations.   Assessment/Plan   Problem List Items Addressed This Visit       Fibromyalgia - Primary    Relevant Medications    naltrexone 1.5 mg capsule    Hypertension    Relevant Medications    lisinopriL-hydrochlorothiazide 10-12.5 mg tablet    HENRY (nonalcoholic steatohepatitis)    Relevant Medications    tirzepatide (Mounjaro) 5 mg/0.5 mL pen injector (Start on 7/14/2024)    tirzepatide (Mounjaro) 2.5 mg/0.5 mL pen injector (Start on 7/14/2024)    PTSD (post-traumatic stress disorder)    Relevant Medications    hydrOXYzine HCL (Atarax) 25 mg tablet    Class 3 severe obesity due to excess calories with serious comorbidity and body mass index (BMI) of 60.0 to 69.9 in adult (Multi)    Relevant Medications    tirzepatide (Mounjaro) 5 mg/0.5 mL pen injector (Start on 7/14/2024)    tirzepatide (Mounjaro) 2.5 mg/0.5 mL pen injector (Start on 7/14/2024)        The  patient is a 38-year-old female with a past medical history of obstructive sleep apnea, nonalcoholic fatty liver disease, hypertension, hypothyroidism, prediabetes  Depression and anxiety and following with counselor. Controlled with sertraline 25 mg daily. Patient denies any suicidal or homicidal ideations.  Labs ordered to follow-up on chronic conditions  Instructed her to follow-up with  [facility] or gynecology  Patient has seen Dr. Chávez for the nonalcoholic fatty liver. At this point time was instructed to have weight loss.   Patient has followed up with Dr. Rober Navarrete for gastroenterology   Prediabetes and morbid obesity with a BMI of 60-started Mounjaro 2.5 mg weekly and then the patient will go to 5 mg which she she will get from the compounding pharmacy.  Discussed the risk and benefit of the medication.  We will follow-up on this in 6 months      Essential hypertension is controlled on current medications      Fibromyalgia-started low-dose naltrexone with the patient and she will continue to take supplements for fibromyalgia.  Patient is exercising regularly.    Follow-up as needed or in 6 months

## 2024-08-27 DIAGNOSIS — M79.7 FIBROMYALGIA: ICD-10-CM

## 2024-08-27 DIAGNOSIS — I10 PRIMARY HYPERTENSION: ICD-10-CM

## 2024-08-27 DIAGNOSIS — E03.9 HYPOTHYROIDISM, UNSPECIFIED TYPE: ICD-10-CM

## 2024-08-27 RX ORDER — THYROID 30 MG/1
30 TABLET ORAL DAILY
Qty: 30 TABLET | Refills: 11 | Status: SHIPPED | OUTPATIENT
Start: 2024-08-27 | End: 2025-08-27

## 2024-08-27 RX ORDER — LISINOPRIL AND HYDROCHLOROTHIAZIDE 10; 12.5 MG/1; MG/1
1 TABLET ORAL DAILY
Qty: 100 TABLET | Refills: 3 | Status: SHIPPED | OUTPATIENT
Start: 2024-08-27 | End: 2025-10-01

## 2024-08-27 RX ORDER — CYCLOBENZAPRINE HCL 10 MG
10 TABLET ORAL 3 TIMES DAILY PRN
Qty: 30 TABLET | Refills: 1 | Status: SHIPPED | OUTPATIENT
Start: 2024-08-27

## 2024-09-12 ENCOUNTER — TELEPHONE (OUTPATIENT)
Dept: PRIMARY CARE | Facility: CLINIC | Age: 39
End: 2024-09-12

## 2024-09-12 ENCOUNTER — LAB (OUTPATIENT)
Dept: LAB | Facility: LAB | Age: 39
End: 2024-09-12

## 2024-09-12 DIAGNOSIS — E03.9 HYPOTHYROIDISM, UNSPECIFIED TYPE: ICD-10-CM

## 2024-09-12 LAB
T4 FREE SERPL-MCNC: 0.95 NG/DL (ref 0.61–1.12)
T4 SERPL-MCNC: 9.4 UG/DL (ref 4.5–11.1)
TSH SERPL-ACNC: 3.46 MIU/L (ref 0.44–3.98)

## 2024-09-12 PROCEDURE — 84443 ASSAY THYROID STIM HORMONE: CPT

## 2024-09-12 PROCEDURE — 36415 COLL VENOUS BLD VENIPUNCTURE: CPT

## 2024-09-12 PROCEDURE — 84436 ASSAY OF TOTAL THYROXINE: CPT

## 2024-09-12 PROCEDURE — 84439 ASSAY OF FREE THYROXINE: CPT

## 2024-10-10 ENCOUNTER — APPOINTMENT (OUTPATIENT)
Dept: PRIMARY CARE | Facility: CLINIC | Age: 39
End: 2024-10-10

## 2024-10-10 VITALS
BODY MASS INDEX: 45.99 KG/M2 | TEMPERATURE: 97.8 F | OXYGEN SATURATION: 98 % | SYSTOLIC BLOOD PRESSURE: 112 MMHG | DIASTOLIC BLOOD PRESSURE: 74 MMHG | WEIGHT: 293 LBS | HEIGHT: 67 IN | HEART RATE: 85 BPM

## 2024-10-10 DIAGNOSIS — E66.813 CLASS 3 SEVERE OBESITY DUE TO EXCESS CALORIES WITH SERIOUS COMORBIDITY AND BODY MASS INDEX (BMI) OF 60.0 TO 69.9 IN ADULT: Primary | ICD-10-CM

## 2024-10-10 DIAGNOSIS — E66.01 CLASS 3 SEVERE OBESITY DUE TO EXCESS CALORIES WITH SERIOUS COMORBIDITY AND BODY MASS INDEX (BMI) OF 60.0 TO 69.9 IN ADULT: Primary | ICD-10-CM

## 2024-10-10 DIAGNOSIS — M79.7 FIBROMYALGIA: ICD-10-CM

## 2024-10-10 DIAGNOSIS — R73.03 PREDIABETES: ICD-10-CM

## 2024-10-10 LAB — POC HEMOGLOBIN A1C: 5.3 % (ref 4.2–6.5)

## 2024-10-10 PROCEDURE — 3074F SYST BP LT 130 MM HG: CPT | Performed by: FAMILY MEDICINE

## 2024-10-10 PROCEDURE — 3008F BODY MASS INDEX DOCD: CPT | Performed by: FAMILY MEDICINE

## 2024-10-10 PROCEDURE — 1036F TOBACCO NON-USER: CPT | Performed by: FAMILY MEDICINE

## 2024-10-10 PROCEDURE — 3078F DIAST BP <80 MM HG: CPT | Performed by: FAMILY MEDICINE

## 2024-10-10 PROCEDURE — 83036 HEMOGLOBIN GLYCOSYLATED A1C: CPT | Performed by: FAMILY MEDICINE

## 2024-10-10 PROCEDURE — 99213 OFFICE O/P EST LOW 20 MIN: CPT | Performed by: FAMILY MEDICINE

## 2024-10-10 RX ORDER — TIRZEPATIDE 10 MG/.5ML
10 INJECTION, SOLUTION SUBCUTANEOUS WEEKLY
Qty: 2 ML | Refills: 3 | Status: SHIPPED | OUTPATIENT
Start: 2024-10-10

## 2024-10-10 NOTE — PROGRESS NOTES
Subjective   Patient ID: Dinora Christensen is a 39 y.o. female who presents for Follow-up (3 month follow up/).    Past Medical, Surgical, and Family History reviewed and updated in chart.    Reviewed all medications by prescribing practitioner or clinical pharmacist (such as prescriptions, OTCs, herbal therapies and supplements) and documented in the medical record.    HPI  1.  Physical exam.  Pap: last done 10 years ago, OBGYN referral   Diet is good.    Exercise:  Crossfit  Immunizations: No shots today  No refills needed today    Review of Systems  All pertinent positive symptoms are included in the history of present illness.    All other systems have been reviewed and are negative and noncontributory to this patient's current ailments.    Past Medical History:   Diagnosis Date    Disease of gallbladder, unspecified     Gallbladder disease    Encounter for gynecological examination (general) (routine) without abnormal findings 06/26/2014    Pap smear, as part of routine gynecological examination    Encounter for routine postpartum follow-up 06/26/2014    Postpartum exam    Endocrine, nutritional and metabolic diseases complicating pregnancy, unspecified trimester ([facility]) 05/02/2014    Hyperthyroidism in pregnancy, antepartum    Morbid (severe) obesity due to excess calories (Multi) 05/26/2020    Severe obesity (BMI >= 40)    Other conditions influencing health status 05/07/2014    History of pregnancy    Other conditions influencing health status     Menstruation    Other conditions influencing health status     History of dyspareunia    Pain in joint, lower leg 02/16/2023    Pain in unspecified hip 09/19/2013    Joint pain, hip    Pelvic and perineal pain     Pelvic pain    Personal history of diseases of the blood and blood-forming organs and certain disorders involving the immune mechanism 05/02/2014    History of anemia    Personal history of other diseases of the digestive system     History of esophageal  reflux    Personal history of other diseases of the respiratory system 2014    History of acute pharyngitis    Personal history of other infectious and parasitic diseases     History of varicella    Personal history of other specified conditions 2014    No post-op complications    Personal history of other specified conditions 2014    History of wheezing    Personal history of other specified conditions 2014    History of chest pain    Personal history of other specified conditions 2014    History of shortness of breath    Snoring 2013    Snoring    Unspecified visual loss     Vision problems     Past Surgical History:   Procedure Laterality Date    CHOLECYSTECTOMY  2013    Cholecystectomy    OTHER SURGICAL HISTORY  2020     section    US GUIDED NEEDLE LIVER BIOPSY  6/15/2020    US GUIDED NEEDLE LIVER BIOPSY 6/15/2020 [facility]     Social History     Tobacco Use    Smoking status: Never     Passive exposure: Never    Smokeless tobacco: Never     No family history on file.    There is no immunization history on file for this patient.  Current Outpatient Medications   Medication Instructions    albuterol 90 mcg/actuation inhaler 2 puffs, inhalation, 3 times daily RT    ALPRAZolam (XANAX) 0.5 mg, oral, Nightly PRN    cholecalciferol (Vitamin D-3) 1,250 mcg (50,000 unit) capsule 1 capsule, oral, Once Weekly    cyclobenzaprine (FLEXERIL) 10 mg, oral, 3 times daily PRN    hydrocortisone 2.5 % cream Topical, 2 times daily PRN    hydrOXYzine HCL (ATARAX) 25 mg, oral, Nightly    ketoconazole (NIZOral) 2 % shampoo Topical, 2 times weekly, Shampoo daily, leave on for 5-10 minutes, then rinse.    Lactobacillus acidophilus/FOS (Lactobac acidoph-fructooligos) 500 million cell-50 mg tablet 1 tablet, oral, Daily    lisinopriL-hydrochlorothiazide 10-12.5 mg tablet 1 tablet, oral, Daily    Mounjaro 10 mg, subcutaneous, Weekly    naltrexone 1.5 mg capsule 3 capsules, oral,  "Nightly    thyroid (pork) (KETURAH THYROID) 30 mg, oral, Daily    vitamin E acid succinate (vitamin E succinate) 268 mg (400 unit) tablet 1 tablet, oral, 2 times daily     No Known Allergies    Objective   Vitals:    10/10/24 0903   BP: 112/74   BP Location: Right arm   Patient Position: Sitting   BP Cuff Size: Adult long   Pulse: 85   Temp: 36.6 °C (97.8 °F)   SpO2: 98%   Weight: (!) 163 kg (360 lb)   Height: 1.689 m (5' 6.5\")     Body mass index is 57.24 kg/m².    BP Readings from Last 3 Encounters:   10/10/24 112/74   07/10/24 108/68   10/05/23 128/82      Wt Readings from Last 3 Encounters:   10/10/24 (!) 163 kg (360 lb)   07/10/24 (!) 172 kg (379 lb)   10/05/23 (!) 174 kg (383 lb)        Lab on 09/12/2024   Component Date Value    Thyroid Stimulating Horm* 09/12/2024 3.46     Thyroxine, Free 09/12/2024 0.95     Thyroxine 09/12/2024 9.4      Physical Exam  General: Pt is sitting up in chair. Appears well-nourished. In no acute distress.  HENT: TM clear bilaterally. Oropharynx without erythema or exudate.  Neck: No cervical lymphadenopathy. No thyromegaly. No carotid bruits.  CV: S1S2 normal. Regular rate and rhythm. No murmurs, rubs, or gallops.  Resp: Clear to auscultation in all lobes. Good aeration. No rales, rhonchi, or wheezes.  GI: Soft, no tenderness to palpation. No organomegaly. No abdominal bruits.   Extremities: No lower extremity edema bilaterally.   Skin: Warm and dry. No rashes or bruising.   Psych: Appropriate responses and actions.      Assessment/Plan   Problem List Items Addressed This Visit       Fibromyalgia    Relevant Medications    naltrexone 1.5 mg capsule    Prediabetes    Relevant Medications    tirzepatide (Mounjaro) 10 mg/0.5 mL pen injector    Other Relevant Orders    POCT glycosylated hemoglobin (Hb A1C) manually resulted    Comprehensive metabolic panel    Class 3 severe obesity due to excess calories with serious comorbidity and body mass index (BMI) of 60.0 to 69.9 in adult - " Primary    Relevant Orders    Comprehensive metabolic panel   Fibromyalgia- Naltrexone 4.5 mg and having pain relief with working out.     Hypothyroidism- TSH and T4 controlled with [place] thyroid 30mg    HTN- Lisinopril-hydrochlorothiazide 10-12.5mg    HENRY- Mounjaro 10 mg. No need to follow with GI at this time.     Depression and anxiety and following with counselor. No longer on sertraline for the past year. Patient denies any suicidal or homicidal ideations. Hydroxyzine needed intermittently. Very rarely uses alprazolam for anxiety.    PTSD- Hydroxyzine 25mg only as needed    Prediabetes ( HBA1C  5.3 10/10/24) and morbid obesity with a BMI of  57- Mounjaro 10 mg weekly compounding pharmacy.  :Losing weight    B12 insufficiency- informed to take B12 supplement due to low normal B12 levels in 2023.    Albuterol PRN with illness, no asthma diagnosis

## 2025-04-04 LAB
ALBUMIN SERPL-MCNC: 4.3 G/DL (ref 3.6–5.1)
ALP SERPL-CCNC: 48 U/L (ref 31–125)
ALT SERPL-CCNC: 25 U/L (ref 6–29)
ANION GAP SERPL CALCULATED.4IONS-SCNC: 9 MMOL/L (CALC) (ref 7–17)
AST SERPL-CCNC: 25 U/L (ref 10–30)
BILIRUB SERPL-MCNC: 0.8 MG/DL (ref 0.2–1.2)
BUN SERPL-MCNC: 13 MG/DL (ref 7–25)
CALCIUM SERPL-MCNC: 9.4 MG/DL (ref 8.6–10.2)
CHLORIDE SERPL-SCNC: 104 MMOL/L (ref 98–110)
CO2 SERPL-SCNC: 26 MMOL/L (ref 20–32)
CREAT SERPL-MCNC: 0.72 MG/DL (ref 0.5–0.97)
EGFRCR SERPLBLD CKD-EPI 2021: 110 ML/MIN/1.73M2
GLUCOSE SERPL-MCNC: 98 MG/DL (ref 65–99)
POTASSIUM SERPL-SCNC: 4.9 MMOL/L (ref 3.5–5.3)
PROT SERPL-MCNC: 7.5 G/DL (ref 6.1–8.1)
SODIUM SERPL-SCNC: 139 MMOL/L (ref 135–146)

## 2025-04-10 ENCOUNTER — APPOINTMENT (OUTPATIENT)
Dept: PRIMARY CARE | Facility: CLINIC | Age: 40
End: 2025-04-10

## 2025-04-10 VITALS
OXYGEN SATURATION: 98 % | TEMPERATURE: 97.2 F | SYSTOLIC BLOOD PRESSURE: 118 MMHG | HEART RATE: 73 BPM | WEIGHT: 293 LBS | HEIGHT: 67 IN | BODY MASS INDEX: 45.99 KG/M2 | DIASTOLIC BLOOD PRESSURE: 86 MMHG

## 2025-04-10 DIAGNOSIS — M79.7 FIBROMYALGIA: ICD-10-CM

## 2025-04-10 PROCEDURE — 99213 OFFICE O/P EST LOW 20 MIN: CPT | Performed by: FAMILY MEDICINE

## 2025-04-10 PROCEDURE — 3079F DIAST BP 80-89 MM HG: CPT | Performed by: FAMILY MEDICINE

## 2025-04-10 PROCEDURE — 1036F TOBACCO NON-USER: CPT | Performed by: FAMILY MEDICINE

## 2025-04-10 PROCEDURE — 3074F SYST BP LT 130 MM HG: CPT | Performed by: FAMILY MEDICINE

## 2025-04-10 PROCEDURE — 3008F BODY MASS INDEX DOCD: CPT | Performed by: FAMILY MEDICINE

## 2025-04-10 RX ORDER — WITCH HAZEL 50 %
PADS, MEDICATED (EA) TOPICAL
COMMUNITY

## 2025-04-10 RX ORDER — CYCLOBENZAPRINE HCL 10 MG
10 TABLET ORAL 3 TIMES DAILY PRN
Qty: 30 TABLET | Refills: 1 | Status: SHIPPED | OUTPATIENT
Start: 2025-04-10

## 2025-04-10 ASSESSMENT — PATIENT HEALTH QUESTIONNAIRE - PHQ9
SUM OF ALL RESPONSES TO PHQ9 QUESTIONS 1 AND 2: 0
2. FEELING DOWN, DEPRESSED OR HOPELESS: NOT AT ALL
1. LITTLE INTEREST OR PLEASURE IN DOING THINGS: NOT AT ALL

## 2025-04-10 NOTE — PROGRESS NOTES
Subjective   Patient ID: Dinora Christensen is a 39 y.o. female who presents for Follow-up.    Past Medical, Surgical, and Family History reviewed and updated in chart.    Reviewed all medications by prescribing practitioner or clinical pharmacist (such as prescriptions, OTCs, herbal therapies and supplements) and documented in the medical record.    HPI  1.  Physical exam.  Pap: last done 10 years ago, OBGYN referral   Diet is good.    Exercise:  Crossfit  Immunizations: No shots today  No refills needed today    Losing weight still  LDN helped with the pain  Stopped BP meds due to normal.      Umbilical hernia    Review of Systems  All pertinent positive symptoms are included in the history of present illness.    All other systems have been reviewed and are negative and noncontributory to this patient's current ailments.    Past Medical History:   Diagnosis Date    Disease of gallbladder, unspecified     Gallbladder disease    Encounter for gynecological examination (general) (routine) without abnormal findings 06/26/2014    Pap smear, as part of routine gynecological examination    Encounter for routine postpartum follow-up 06/26/2014    Postpartum exam    Endocrine, nutritional and metabolic diseases complicating pregnancy, unspecified trimester ([facility]) 05/02/2014    Hyperthyroidism in pregnancy, antepartum    Morbid (severe) obesity due to excess calories (Multi) 05/26/2020    Severe obesity (BMI >= 40)    Other conditions influencing health status 05/07/2014    History of pregnancy    Other conditions influencing health status     Menstruation    Other conditions influencing health status     History of dyspareunia    Pain in joint, lower leg 02/16/2023    Pain in unspecified hip 09/19/2013    Joint pain, hip    Pelvic and perineal pain     Pelvic pain    Personal history of diseases of the blood and blood-forming organs and certain disorders involving the immune mechanism 05/02/2014    History of anemia     Personal history of other diseases of the digestive system     History of esophageal reflux    Personal history of other diseases of the respiratory system 2014    History of acute pharyngitis    Personal history of other infectious and parasitic diseases     History of varicella    Personal history of other specified conditions 2014    No post-op complications    Personal history of other specified conditions 2014    History of wheezing    Personal history of other specified conditions 2014    History of chest pain    Personal history of other specified conditions 2014    History of shortness of breath    Snoring 2013    Snoring    Unspecified visual loss     Vision problems     Past Surgical History:   Procedure Laterality Date    CHOLECYSTECTOMY  2013    Cholecystectomy    OTHER SURGICAL HISTORY  2020     section    US GUIDED NEEDLE LIVER BIOPSY  6/15/2020    US GUIDED NEEDLE LIVER BIOPSY 6/15/2020 [facility]     Social History     Tobacco Use    Smoking status: Never     Passive exposure: Never    Smokeless tobacco: Never     No family history on file.    There is no immunization history on file for this patient.  Current Outpatient Medications   Medication Instructions    albuterol 90 mcg/actuation inhaler 2 puffs, inhalation, 3 times daily RT    ALPRAZolam (XANAX) 0.5 mg, oral, Nightly PRN    cholecalciferol (Vitamin D-3) 1,250 mcg (50,000 unit) capsule 1 capsule, Once Weekly    cyclobenzaprine (FLEXERIL) 10 mg, oral, 3 times daily PRN    hydrocortisone 2.5 % cream Topical, 2 times daily PRN    hydrOXYzine HCL (ATARAX) 25 mg, oral, Nightly    Lactobacillus acidophilus 1 billion cell tablet Take by mouth.    Lactobacillus acidophilus/FOS (Lactobac acidoph-fructooligos) 500 million cell-50 mg tablet 1 tablet, Daily    thyroid (pork) (ARMOUR THYROID) 30 mg, oral, Daily     No Known Allergies    Objective   Vitals:    04/10/25 0926   BP: 118/86   BP  "Location: Right arm   Patient Position: Sitting   BP Cuff Size: Adult   Pulse: 73   Temp: 36.2 °C (97.2 °F)   SpO2: 98%   Weight: (!) 152 kg (336 lb)   Height: 1.689 m (5' 6.5\")       Body mass index is 53.42 kg/m².    BP Readings from Last 3 Encounters:   04/10/25 118/86   10/10/24 112/74   07/10/24 108/68      Wt Readings from Last 3 Encounters:   04/10/25 (!) 152 kg (336 lb)   10/10/24 (!) 163 kg (360 lb)   07/10/24 (!) 172 kg (379 lb)        Orders Only on 04/03/2025   Component Date Value    GLUCOSE 04/03/2025 98     UREA NITROGEN (BUN) 04/03/2025 13     CREATININE 04/03/2025 0.72     EGFR 04/03/2025 110     SODIUM 04/03/2025 139     POTASSIUM 04/03/2025 4.9     CHLORIDE 04/03/2025 104     CARBON DIOXIDE 04/03/2025 26     ELECTROLYTE BALANCE 04/03/2025 9     CALCIUM 04/03/2025 9.4     PROTEIN, TOTAL 04/03/2025 7.5     ALBUMIN 04/03/2025 4.3     BILIRUBIN, TOTAL 04/03/2025 0.8     ALKALINE PHOSPHATASE 04/03/2025 48     AST 04/03/2025 25     ALT 04/03/2025 25      Physical Exam  General: Pt is sitting up in chair. Appears well-nourished. In no acute distress.  HENT: TM clear bilaterally. Oropharynx without erythema or exudate.  Neck: No cervical lymphadenopathy. No thyromegaly. No carotid bruits.  CV: S1S2 normal. Regular rate and rhythm. No murmurs, rubs, or gallops.  Resp: Clear to auscultation in all lobes. Good aeration. No rales, rhonchi, or wheezes.  GI: Soft, no tenderness to palpation. No organomegaly. No abdominal bruits.   Extremities: No lower extremity edema bilaterally.   Skin: Warm and dry. No rashes or bruising.   Psych: Appropriate responses and actions.      Assessment/Plan   Problem List Items Addressed This Visit       Fibromyalgia    Relevant Medications    cyclobenzaprine (Flexeril) 10 mg tablet     Fibromyalgia- Naltrexone 4.5 mg and having pain relief with working out.     Hypothyroidism- TSH and T4 controlled with [place] thyroid 30mg    HTN- Controlled without meds    HENRY-Losing weight " and exercising and following diuet.  Liver functions are good    Depression and anxiety and following with counselor. No longer on sertraline for the past year. Patient denies any suicidal or homicidal ideations. Hydroxyzine needed intermittently. Very rarely uses alprazolam for anxiety.    PTSD- Hydroxyzine 25mg only as needed    Prediabetes ( HBA1C  5.3 10/10/24) and morbid obesity with a BMI of  57- Mounjaro 10 mg weekly compounding pharmacy.  :Losing weight    B12 insufficiency- informed to take B12 supplement due to low normal B12 levels in 2023.    Albuterol PRN with illness, no asthma diagnosis

## 2025-04-29 DIAGNOSIS — J20.9 ACUTE BRONCHITIS, UNSPECIFIED ORGANISM: ICD-10-CM

## 2025-04-29 RX ORDER — ALBUTEROL SULFATE 90 UG/1
2 INHALANT RESPIRATORY (INHALATION)
Qty: 18 G | Refills: 2 | Status: SHIPPED | OUTPATIENT
Start: 2025-04-29

## 2025-05-20 ENCOUNTER — APPOINTMENT (OUTPATIENT)
Dept: OBSTETRICS AND GYNECOLOGY | Facility: CLINIC | Age: 40
End: 2025-05-20

## 2025-05-20 VITALS — WEIGHT: 293 LBS | DIASTOLIC BLOOD PRESSURE: 82 MMHG | BODY MASS INDEX: 54.06 KG/M2 | SYSTOLIC BLOOD PRESSURE: 140 MMHG

## 2025-05-20 DIAGNOSIS — Z11.51 SCREENING FOR HPV (HUMAN PAPILLOMAVIRUS): ICD-10-CM

## 2025-05-20 DIAGNOSIS — Z01.419 ENCOUNTER FOR WELL WOMAN EXAM WITH ROUTINE GYNECOLOGICAL EXAM: Primary | ICD-10-CM

## 2025-05-20 DIAGNOSIS — Z12.4 SCREENING FOR CERVICAL CANCER: ICD-10-CM

## 2025-05-20 PROCEDURE — 3079F DIAST BP 80-89 MM HG: CPT | Performed by: NURSE PRACTITIONER

## 2025-05-20 PROCEDURE — 99395 PREV VISIT EST AGE 18-39: CPT | Performed by: NURSE PRACTITIONER

## 2025-05-20 PROCEDURE — 1036F TOBACCO NON-USER: CPT | Performed by: NURSE PRACTITIONER

## 2025-05-20 PROCEDURE — 87626 HPV SEP HI-RSK TYP&POOL RSLT: CPT

## 2025-05-20 PROCEDURE — 3077F SYST BP >= 140 MM HG: CPT | Performed by: NURSE PRACTITIONER

## 2025-05-20 NOTE — PROGRESS NOTES
HPI:   Dinora Christensen is a 40 y.o. who presents today for her annual gynecologic exam without complaints    She has the following concerns; None. Needs to establish care. No GYN concerns. Due for a PAP. Last done in .       GYN HISTORY:  Periods are regular every 28-30 days, lasting 4 days.   Dysmenorrhea:moderate, occurring throughout menses. Cyclic symptoms include moodiness and pelvic pain.   No intermenstrual bleeding, spotting, or discharge.    Current contraception: abstinence      Requests STD testing: no     PAP History   Last pap:    Normal HPV Not done  History of abnormal pap: no  HPV vaccine: no  @paphx@    Health Screening  Family history of breast, uterine, ovarian or colon cancer: no   Breast cancer: mammogram - due at age 42.   Colon cancer: due at age 47       The patient feels safe at home.     Surgical History[1]   Medical History[2]         Review of Systems:   Constitutional: no fever and no chills.  Cardiovascular: no chest pain.   Respiratory: no shortness of breath.   Gastrointestinal: no nausea, no abdominal pain and no constipation  Genitourinary: no dysuria, no urinary incontinence, no vaginal dryness, no pelvic pain and no vaginal discharge.   Neurological: no headache.  Psychiatric: no anxiety and no depression.              Objective         /82   Wt (!) 154 kg (340 lb)   LMP 2025   BMI 54.06 kg/m²         Physical Exam:   Constitutional: Alert and in no acute distress. Well developed, well nourished.      Neck: No neck asymmetry. Supple. Thyroid not enlarged and there were no palpable thyroid nodules.      Cardiovascular: Heart rate and rhythm were normal, normal S1 and S2, no gallops, and no murmurs.      Pulmonary: No respiratory distress. Clear bilateral breath sounds.      Chest: Breasts: Normal appearance, no nipple discharge and no skin changes. Palpation of breasts and axillae: No palpable mass and no axillary lymphadenopathy.      Abdomen: Soft  nontender; no abdominal mass palpated. Normal bowel sounds. No organomegaly.      Genitourinary:   - External genitalia: Normal.   - Palpation of lymph nodes in groin: No inguinal lymphadenopathy.   - Bartholin's Urethral and Skenes Glands: Normal.   - Urethra: Normal.    -Bladder: Normal on palpation.   - Vagina: Normal.   - Cervix: Normal.   - Uterus: Normal. Right Adnexa/parametria: Normal. Left Adnexa/parametria: Normal.   - Perianal Area: Normal.      Skin: Normal skin color and pigmentation, normal skin turgor, and no rash     Psychiatric: Alert and oriented x 3. Affect normal to patient baseline. Mood: Appropriate.            Assessment/Plan       Diagnoses and all orders for this visit:  Encounter for well woman exam with routine gynecological exam  Here for well woman exam. She is doing well. Needs to establish care. PAP obtained. Last PAP was in . Mammogram due at age 42.   Screening for cervical cancer  -     THINPREP PAP TEST (>30)  Screening for HPV (human papillomavirus)  -     THINPREP PAP TEST (>30)  Follow-up annually; sooner if needed.        Phuong Alberts, SADAF-CNP        [1]   Past Surgical History:  Procedure Laterality Date     SECTION, LOW TRANSVERSE  05/10/2014    CHOLECYSTECTOMY  2013    Cholecystectomy    US GUIDED NEEDLE LIVER BIOPSY  06/15/2020    US GUIDED NEEDLE LIVER BIOPSY 6/15/2020 [facility]   [2]   Past Medical History:  Diagnosis Date    Anxiety     Disease of gallbladder, unspecified     Gallbladder disease    Disease of thyroid gland     Encounter for gynecological examination (general) (routine) without abnormal findings 2014    Pap smear, as part of routine gynecological examination    Encounter for routine postpartum follow-up 2014    Postpartum exam    Endocrine, nutritional and metabolic diseases complicating pregnancy, unspecified trimester ([facility] 2014    Hyperthyroidism in pregnancy, antepartum    Hypertension      Hypothyroidism     Morbid (severe) obesity due to excess calories (Multi) 05/26/2020    Severe obesity (BMI >= 40)    Other conditions influencing health status 05/07/2014    History of pregnancy    Other conditions influencing health status     Menstruation    Other conditions influencing health status     History of dyspareunia    Pain in joint, lower leg 02/16/2023    Pain in unspecified hip 09/19/2013    Joint pain, hip    Pelvic and perineal pain     Pelvic pain    Personal history of diseases of the blood and blood-forming organs and certain disorders involving the immune mechanism 05/02/2014    History of anemia    Personal history of other diseases of the digestive system     History of esophageal reflux    Personal history of other diseases of the respiratory system 04/25/2014    History of acute pharyngitis    Personal history of other infectious and parasitic diseases     History of varicella    Personal history of other specified conditions 05/29/2014    No post-op complications    Personal history of other specified conditions 04/25/2014    History of wheezing    Personal history of other specified conditions 04/25/2014    History of chest pain    Personal history of other specified conditions 05/07/2014    History of shortness of breath    Snoring 09/23/2013    Snoring    Unspecified visual loss     Vision problems    Varicella

## 2025-06-03 LAB
CYTOLOGY CMNT CVX/VAG CYTO-IMP: NORMAL
HPV HR 12 DNA GENITAL QL NAA+PROBE: NEGATIVE
HPV HR GENOTYPES PNL CVX NAA+PROBE: NEGATIVE
HPV16 DNA SPEC QL NAA+PROBE: NEGATIVE
HPV18 DNA SPEC QL NAA+PROBE: NEGATIVE
LAB AP HPV GENOTYPE QUESTION: YES
LAB AP HPV HR: NORMAL
LABORATORY COMMENT REPORT: NORMAL
PATH REPORT.TOTAL CANCER: NORMAL

## 2025-09-04 DIAGNOSIS — E03.9 HYPOTHYROIDISM, UNSPECIFIED TYPE: ICD-10-CM

## 2025-09-04 RX ORDER — LEVOTHYROXINE, LIOTHYRONINE 19; 4.5 UG/1; UG/1
30 TABLET ORAL DAILY
Qty: 30 TABLET | Refills: 11 | Status: SHIPPED | OUTPATIENT
Start: 2025-09-04

## 2026-04-16 ENCOUNTER — APPOINTMENT (OUTPATIENT)
Dept: PRIMARY CARE | Facility: CLINIC | Age: 41
End: 2026-04-16

## 2026-06-02 ENCOUNTER — APPOINTMENT (OUTPATIENT)
Dept: OBSTETRICS AND GYNECOLOGY | Facility: CLINIC | Age: 41
End: 2026-06-02